# Patient Record
Sex: MALE | URBAN - METROPOLITAN AREA
[De-identification: names, ages, dates, MRNs, and addresses within clinical notes are randomized per-mention and may not be internally consistent; named-entity substitution may affect disease eponyms.]

---

## 2020-04-14 ENCOUNTER — APPOINTMENT (OUTPATIENT)
Age: 45
Setting detail: DERMATOLOGY
End: 2020-04-17

## 2020-04-14 DIAGNOSIS — L40.0 PSORIASIS VULGARIS: ICD-10-CM

## 2020-04-14 PROCEDURE — 99202 OFFICE O/P NEW SF 15 MIN: CPT

## 2020-04-14 PROCEDURE — OTHER PRESCRIPTION: OTHER

## 2020-04-14 PROCEDURE — OTHER RECOMMENDATIONS: OTHER

## 2020-04-14 PROCEDURE — OTHER MIPS QUALITY: OTHER

## 2020-04-14 PROCEDURE — OTHER ORDER TESTS: OTHER

## 2020-04-14 RX ORDER — BETAMETHASONE DIPROPIONATE 0.5 MG/G
OINTMENT TOPICAL
Qty: 2 | Refills: 6 | Status: ERX | COMMUNITY
Start: 2020-04-14

## 2020-04-14 NOTE — PROCEDURE: MIPS QUALITY
Quality 47: Advance Care Plan: Advance Care Planning discussed and documented; advance care plan or surrogate decision maker documented in the medical record.
Quality 154 Part A: Falls: Risk Assessment (Should Be Reported With Measure 155.): Falls risk assessment completed and documented in the past 12 months.
Quality 431: Preventive Care And Screening: Unhealthy Alcohol Use - Screening: Patient screened for unhealthy alcohol use using a single question and scores less than 2 times per year
Quality 226: Preventive Care And Screening: Tobacco Use: Screening And Cessation Intervention: Patient screened for tobacco use and is an ex/non-smoker
Quality 154 Part B: Falls: Risk Screening (Should Be Reported With Measure 155.): No documentation of falls status
Quality 110: Preventive Care And Screening: Influenza Immunization: Influenza Immunization previously received during influenza season
Detail Level: Detailed

## 2020-04-14 NOTE — PROCEDURE: RECOMMENDATIONS
Recommendation Preamble: The following recommendations were:
Recommendations (Free Text): Patient is currently on a BETA blocker. This is known to cause or make psoriasis worse. Patient may consider stronger topical medication or Taltz.
Detail Level: Zone

## 2020-04-17 ENCOUNTER — RX ONLY (RX ONLY)
Age: 45
End: 2020-04-17

## 2020-04-17 RX ORDER — IXEKIZUMAB 80 MG/ML
INJECTION, SOLUTION SUBCUTANEOUS
Qty: 3 | Refills: 5 | Status: ERX | COMMUNITY
Start: 2020-04-17

## 2020-05-19 ENCOUNTER — APPOINTMENT (OUTPATIENT)
Age: 45
Setting detail: DERMATOLOGY
End: 2020-05-22

## 2020-05-19 DIAGNOSIS — L40.0 PSORIASIS VULGARIS: ICD-10-CM

## 2020-05-19 PROCEDURE — 99211 OFF/OP EST MAY X REQ PHY/QHP: CPT

## 2020-05-19 PROCEDURE — OTHER DIAGNOSIS COMMENT: OTHER

## 2020-05-19 PROCEDURE — OTHER ORDER TESTS: OTHER

## 2020-05-19 PROCEDURE — OTHER RECOMMENDATIONS: OTHER

## 2020-05-19 PROCEDURE — OTHER MIPS QUALITY: OTHER

## 2020-05-19 NOTE — PROCEDURE: DIAGNOSIS COMMENT
Detail Level: Detailed
Comment: No exam performed, patient was here for a discussion regarding Taltz and labs that must be performed before starting Taltz

## 2020-05-19 NOTE — PROCEDURE: MIPS QUALITY
Quality 47: Advance Care Plan: Advance Care Planning discussed and documented; advance care plan or surrogate decision maker documented in the medical record.
Detail Level: Detailed
Quality 154 Part B: Falls: Risk Screening (Should Be Reported With Measure 155.): No documentation of falls status
Quality 226: Preventive Care And Screening: Tobacco Use: Screening And Cessation Intervention: Patient screened for tobacco use and is an ex/non-smoker
Quality 431: Preventive Care And Screening: Unhealthy Alcohol Use - Screening: Patient screened for unhealthy alcohol use using a single question and scores less than 2 times per year
Quality 154 Part A: Falls: Risk Assessment (Should Be Reported With Measure 155.): Falls risk assessment completed and documented in the past 12 months.
Quality 110: Preventive Care And Screening: Influenza Immunization: Influenza Immunization previously received during influenza season

## 2020-05-19 NOTE — PROCEDURE: RECOMMENDATIONS
Detail Level: Zone
Recommendations (Free Text): Patient is currently on a BETA blocker. This is known to cause or make psoriasis worse. Patient has started the Taltz initiation process, waiting on approval and lab results to come in
Recommendation Preamble: The following recommendations were:

## 2020-06-16 ENCOUNTER — APPOINTMENT (OUTPATIENT)
Age: 45
Setting detail: DERMATOLOGY
End: 2020-06-18

## 2020-06-16 ENCOUNTER — RX ONLY (RX ONLY)
Age: 45
End: 2020-06-16

## 2020-06-16 DIAGNOSIS — L40.0 PSORIASIS VULGARIS: ICD-10-CM

## 2020-06-16 PROCEDURE — OTHER PRESCRIPTION: OTHER

## 2020-06-16 PROCEDURE — 99213 OFFICE O/P EST LOW 20 MIN: CPT

## 2020-06-16 PROCEDURE — OTHER DIAGNOSIS COMMENT: OTHER

## 2020-06-16 PROCEDURE — OTHER COUNSELING: OTHER

## 2020-06-16 PROCEDURE — OTHER MIPS QUALITY: OTHER

## 2020-06-16 RX ORDER — TRIAMCINOLONE ACETONIDE 1 MG/G
CREAM TOPICAL
Qty: 1 | Refills: 3 | Status: ERX | COMMUNITY
Start: 2020-06-16

## 2020-06-16 RX ORDER — BETAMETHASONE DIPROPIONATE 0.5 MG/G
OINTMENT TOPICAL
Qty: 2 | Refills: 6 | Status: ERX

## 2020-06-16 NOTE — PROCEDURE: MIPS QUALITY
Quality 47: Advance Care Plan: Advance Care Planning discussed and documented; advance care plan or surrogate decision maker documented in the medical record.
Quality 431: Preventive Care And Screening: Unhealthy Alcohol Use - Screening: Patient screened for unhealthy alcohol use using a single question and scores less than 2 times per year
Quality 154 Part A: Falls: Risk Assessment (Should Be Reported With Measure 155.): Falls risk assessment completed and documented in the past 12 months.
Detail Level: Detailed
Quality 226: Preventive Care And Screening: Tobacco Use: Screening And Cessation Intervention: Patient screened for tobacco use and is an ex/non-smoker
Quality 110: Preventive Care And Screening: Influenza Immunization: Influenza Immunization previously received during influenza season
Quality 154 Part B: Falls: Risk Screening (Should Be Reported With Measure 155.): No documentation of falls status

## 2020-06-16 NOTE — PROCEDURE: DIAGNOSIS COMMENT
Comment: Patient is currently on a BETA blocker. This is known to cause or make psoriasis worse. Patient has had Taltz loading dose and will use Triamcinolone cream for flares
Detail Level: Detailed

## 2020-07-14 ENCOUNTER — APPOINTMENT (OUTPATIENT)
Age: 45
Setting detail: DERMATOLOGY
End: 2020-07-18

## 2020-07-14 DIAGNOSIS — L40.0 PSORIASIS VULGARIS: ICD-10-CM

## 2020-07-14 PROCEDURE — OTHER COUNSELING: OTHER

## 2020-07-14 PROCEDURE — OTHER MIPS QUALITY: OTHER

## 2020-07-14 PROCEDURE — 99213 OFFICE O/P EST LOW 20 MIN: CPT

## 2020-07-14 PROCEDURE — OTHER RECOMMENDATIONS: OTHER

## 2020-07-14 ASSESSMENT — PGA PSORIASIS: PGA PSORIASIS 2020: MODERATE

## 2020-07-14 NOTE — PROCEDURE: RECOMMENDATIONS
Detail Level: Zone
Recommendations (Free Text): Continue using topical while on Taltz
Recommendation Preamble: The following recommendations:

## 2020-07-14 NOTE — PROCEDURE: MIPS QUALITY
Quality 154 Part A: Falls: Risk Assessment (Should Be Reported With Measure 155.): Falls risk assessment completed and documented in the past 12 months.
Quality 110: Preventive Care And Screening: Influenza Immunization: Influenza Immunization previously received during influenza season
Quality 47: Advance Care Plan: Advance Care Planning discussed and documented; advance care plan or surrogate decision maker documented in the medical record.
Quality 226: Preventive Care And Screening: Tobacco Use: Screening And Cessation Intervention: Patient screened for tobacco use and is an ex/non-smoker
Quality 431: Preventive Care And Screening: Unhealthy Alcohol Use - Screening: Patient screened for unhealthy alcohol use using a single question and scores less than 2 times per year
Detail Level: Detailed
Quality 154 Part B: Falls: Risk Screening (Should Be Reported With Measure 155.): No documentation of falls status
